# Patient Record
Sex: MALE | URBAN - METROPOLITAN AREA
[De-identification: names, ages, dates, MRNs, and addresses within clinical notes are randomized per-mention and may not be internally consistent; named-entity substitution may affect disease eponyms.]

---

## 2017-08-07 ENCOUNTER — APPOINTMENT (OUTPATIENT)
Dept: GENERAL RADIOLOGY | Facility: CLINIC | Age: 22
End: 2017-08-07
Attending: NURSE PRACTITIONER
Payer: OTHER MISCELLANEOUS

## 2017-08-07 ENCOUNTER — HOSPITAL ENCOUNTER (EMERGENCY)
Facility: CLINIC | Age: 22
Discharge: HOME | End: 2017-08-07
Attending: NURSE PRACTITIONER | Admitting: NURSE PRACTITIONER
Payer: OTHER MISCELLANEOUS

## 2017-08-07 VITALS
RESPIRATION RATE: 16 BRPM | HEIGHT: 66 IN | TEMPERATURE: 98.2 F | DIASTOLIC BLOOD PRESSURE: 74 MMHG | BODY MASS INDEX: 22.32 KG/M2 | WEIGHT: 138.89 LBS | SYSTOLIC BLOOD PRESSURE: 130 MMHG | HEART RATE: 63 BPM | OXYGEN SATURATION: 99 %

## 2017-08-07 DIAGNOSIS — S81.012A: ICD-10-CM

## 2017-08-07 PROCEDURE — 90715 TDAP VACCINE 7 YRS/> IM: CPT | Performed by: NURSE PRACTITIONER

## 2017-08-07 PROCEDURE — 25000128 H RX IP 250 OP 636: Performed by: NURSE PRACTITIONER

## 2017-08-07 PROCEDURE — 90471 IMMUNIZATION ADMIN: CPT

## 2017-08-07 PROCEDURE — 73562 X-RAY EXAM OF KNEE 3: CPT | Mod: LT

## 2017-08-07 PROCEDURE — 12002 RPR S/N/AX/GEN/TRNK2.6-7.5CM: CPT

## 2017-08-07 PROCEDURE — 99283 EMERGENCY DEPT VISIT LOW MDM: CPT | Mod: 25

## 2017-08-07 RX ORDER — CEPHALEXIN 500 MG/1
500 CAPSULE ORAL 4 TIMES DAILY
Qty: 28 CAPSULE | Refills: 0 | Status: SHIPPED | OUTPATIENT
Start: 2017-08-07 | End: 2017-08-14

## 2017-08-07 RX ADMIN — CLOSTRIDIUM TETANI TOXOID ANTIGEN (FORMALDEHYDE INACTIVATED), CORYNEBACTERIUM DIPHTHERIAE TOXOID ANTIGEN (FORMALDEHYDE INACTIVATED), BORDETELLA PERTUSSIS TOXOID ANTIGEN (GLUTARALDEHYDE INACTIVATED), BORDETELLA PERTUSSIS FILAMENTOUS HEMAGGLUTININ ANTIGEN (FORMALDEHYDE INACTIVATED), BORDETELLA PERTUSSIS PERTACTIN ANTIGEN, AND BORDETELLA PERTUSSIS FIMBRIAE 2/3 ANTIGEN 0.5 ML: 5; 2; 2.5; 5; 3; 5 INJECTION, SUSPENSION INTRAMUSCULAR at 20:57

## 2017-08-07 ASSESSMENT — ENCOUNTER SYMPTOMS
WOUND: 1
NUMBNESS: 0
WEAKNESS: 0

## 2017-08-07 NOTE — ED AVS SNAPSHOT
Emergency Department    [address] [facility] [address]    Phone:  [phone]    Fax:  [phone]                                       Issac Radford   MRN: [id number]    Department:   Emergency Department   Date of Visit:  8/7/2017           Patient Information     Date Of Birth          1995        Your diagnoses for this visit were:     Laceration of knee without complication, left, initial encounter        You were seen by Zeny Montilla, CNP.      Follow-up Information     Follow up with Travis Mancini MD In 1 week.    Specialty:  Internal Medicine    Why:  10-14 days for suture removal    Contact information:    [facility]  [address]  [facility] [id number] [phone]          Follow up with  Emergency Department.    Specialty:  EMERGENCY MEDICINE    Why:  As needed, If symptoms worsen    Contact information:    [address] [facility] [address] [phone]        Discharge Instructions       Discharge Instructions  Laceration (Cut)    You were seen today for a laceration (cut).  Your provider examined your laceration for any problems such a buried foreign body (like glass, a splinter, or gravel), or injury to blood vessels, tendons, and nerves.  Your provider may have also rinsed and/or scrubbed your laceration to help prevent an infection. It may not be possible to find all problems with your laceration on the first visit; occasionally foreign bodies or a tendon injury can go undetected.    Your laceration may have been closed in one of several ways:    No closure: many wounds will heal just fine without closure.    Stitches: regular stitches that require removal.    Staples: skin staples are often used in the scalp/head.    Wound adhesive (glue): skin glue can be used for certain lacerations and doesn t require removal.    Wound strips (aka Butterfly bandages or steri-strips): these are bandages that help to  close a wound.    Absorbable stitches:  dissolving  stitches that go away on their own and usually don t require removal.    A small percentage of wounds will develop an infection regardless of how well the wound is cared for. Antibiotics are generally not indicated to prevent an infection so are only given for a small number of high-risk wounds. Some lacerations are too high risk to close, and are left open to heal because closure can increase the likelihood that an infection will develop.    Remember that all lacerations, no matter how expertly repaired, will cause scarring. We consider many factors, techniques, and materials, in our efforts to provide the best possible cosmetic outcome.    Generally, every Emergency Department visit should have a follow-up clinic visit with either a primary or a specialty clinic/provider. Please follow-up as instructed by your emergency provider today.     Return to the Emergency Department right away if:    You have more redness, swelling, pain, drainage (pus), a bad smell, or red streaking from your laceration as these symptoms could indicate an infection.    You have a fever of 100.4 F or more.    You have bleeding that you cannot stop at home. If your cut starts to bleed, hold pressure on the bleeding area with a clean cloth or put pressure over the bandage.  If the bleeding does not stop after using constant pressure for 30 minutes, you should return to the Emergency Department for further treatment.    An area past the laceration is cool, pale, or blue compared with the other side, or has a slower return of color when squeezed.    Your dressing seems too tight or starts to get uncomfortable or painful. For children, signs of a problem might be irritability or restlessness.    You have loss of normal function or use of an area, such as being unable to straighten or bend a finger normally.    You have a numb area past the laceration.    Return to the Emergency Department or  see your regular provider if:    The laceration starts to come open.     You have something coming out of the cut or a feeling that there is something in the laceration.    Your wound will not heal, or keeps breaking open. There can always be glass, wood, dirt or other things in any wound.  They will not always show up, even on x-rays.  If a wound does not heal, this may be why, and it is important to follow-up with your regular provider.    Home Care:    Take your dressing off in 12-24 hours, or as instructed by your provider, to check your laceration. Remove the dressing sooner if it seems too tight or painful, or if it is getting numb, tingly, or pale past the dressing.    Gently wash your laceration 1-2 times daily with clean water and mild soap. It is okay to shower or run clean water over the laceration, but do not let the laceration soak in water (no swimming).    If your laceration was closed with wound adhesive or strips: pat it dry and leave it open to the air. For all other repairs: after you wash your laceration, or at least 2 times a day, apply antibiotic ointment (such as Neosporin  or Bacitracin ) to the laceration, then cover it with a Band-Aid  or gauze.    Keep the laceration clean. Wear gloves or other protective clothing if you are around dirt.    Follow-up for removal:    If your wound was closed with staples or regular stitches, they need to be removed according to the instructions and timeline specified by your provider today.    If your wound was closed with absorbable ( dissolving ) sutures, they should fall out, dissolve, or not be visible in about one week. If they are still visible, then they should be removed according to the instructions and timeline specified by your provider today.    Scars:  To help minimize scarring:    Wear sunscreen over the healed laceration when out in the sun.    Massage the area regularly once healed.    You may apply Vitamin E to the healed wound.    Wait.  Scars improve in appearance over months and years.    If you were given a prescription for medicine here today, be sure to read all of the information (including the package insert) that comes with your prescription.  This will include important information about the medicine, its side effects, and any warnings that you need to know about.  The pharmacist who fills the prescription can provide more information and answer questions you may have about the medicine.  If you have questions or concerns that the pharmacist cannot address, please call or return to the Emergency Department.       Remember that you can always come back to the Emergency Department if you are not able to see your regular provider in the amount of time listed above, if you get any new symptoms, or if there is anything that worries you.    24 Hour Appointment Hotline       To make an appointment at any [facility], call [phone] ([phone]). If you don't have a family doctor or clinic, we will help you find one. [facility] clinics are conveniently located to serve the needs of you and your family.             Review of your medicines      START taking        Dose / Directions Last dose taken    cephALEXin 500 MG capsule   Commonly known as:  KEFLEX   Dose:  500 mg   Quantity:  28 capsule        Take 1 capsule (500 mg) by mouth 4 times daily for 7 days   Refills:  0                Prescriptions were sent or printed at these locations (1 Prescription)                   Other Prescriptions                Printed at Department/Unit printer (1 of 1)         cephALEXin (KEFLEX) 500 MG capsule                Procedures and tests performed during your visit     Knee XR, 3 views, left      Orders Needing Specimen Collection     None      Pending Results     No orders found from 8/5/2017 to 8/8/2017.            Pending Culture Results     No orders found from 8/5/2017 to 8/8/2017.            Pending Results Instructions     If you had any lab  results that were not finalized at the time of your Discharge, you can call the ED Lab Result RN at [phone]. You will be contacted by this team for any positive Lab results or changes in treatment. The nurses are available 7 days a week from 10A to 6:30P.  You can leave a message 24 hours per day and they will return your call.        Test Results From Your Hospital Stay        8/7/2017  9:31 PM      Narrative     LEFT KNEE THREE VIEWS  8/7/2017 9:07 PM      HISTORY: Struck knee on metal with 2cm laceration.    COMPARISON: None.        Impression     IMPRESSION: No acute fracture or dislocation. No radiopaque foreign  body.    GHAZALA DAS MD                Clinical Quality Measure: Blood Pressure Screening     Your blood pressure was checked while you were in the emergency department today. The last reading we obtained was  BP: 130/74 . Please read the guidelines below about what these numbers mean and what you should do about them.  If your systolic blood pressure (the top number) is less than 120 and your diastolic blood pressure (the bottom number) is less than 80, then your blood pressure is normal. There is nothing more that you need to do about it.  If your systolic blood pressure (the top number) is 120-139 or your diastolic blood pressure (the bottom number) is 80-89, your blood pressure may be higher than it should be. You should have your blood pressure rechecked within a year by a primary care provider.  If your systolic blood pressure (the top number) is 140 or greater or your diastolic blood pressure (the bottom number) is 90 or greater, you may have high blood pressure. High blood pressure is treatable, but if left untreated over time it can put you at risk for heart attack, stroke, or kidney failure. You should have your blood pressure rechecked by a primary care provider within the next 4 weeks.  If your provider in the emergency department today gave you specific instructions to follow-up  "with your doctor or provider even sooner than that, you should follow that instruction and not wait for up to 4 weeks for your follow-up visit.        Thank you for choosing [facility]       Thank you for choosing [facility] for your care. Our goal is always to provide you with excellent care. Hearing back from our patients is one way we can continue to improve our services. Please take a few minutes to complete the written survey that you may receive in the mail after you visit with us. Thank you!        visetohart Information     [employer] lets you send messages to your doctor, view your test results, renew your prescriptions, schedule appointments and more. To sign up, go to [email or web address] . Click on \"Log in\" on the left side of the screen, which will take you to the Welcome page. Then click on \"Sign up Now\" on the right side of the page.     You will be asked to enter the access code listed below, as well as some personal information. Please follow the directions to create your username and password.     Your access code is: [email or web address]  Expires: 2017 10:21 PM     Your access code will  in 90 days. If you need help or a new code, please call your [facility] clinic or [phone].        Care EveryWhere ID     This is your Care EveryWhere ID. This could be used by other organizations to access your [facility] medical records  [id number]        Equal Access to Services     JEREMIAH BAIRD : Hadii amanda Stapleton, surekha mae, qamassiel peters . So [facility] [phone].    ATENCIÓN: Si habla español, tiene a negro disposición servicios gratuitos de asistencia lingüística. Sara al [phone].    We comply with applicable federal civil rights laws and [place] laws. We do not discriminate on the basis of race, color, national origin, age, disability sex, sexual orientation or gender identity.            After Visit Summary       This is your record. " Keep this with you and show to your community pharmacist(s) and doctor(s) at your next visit.

## 2017-08-07 NOTE — ED AVS SNAPSHOT
Emergency Department    [address] [facility] [address]    Phone:  [phone]    Fax:  [phone]                                       Issac Radford   MRN: [id number]    Department:   Emergency Department   Date of Visit:  8/7/2017           After Visit Summary Signature Page     I have received my discharge instructions, and my questions have been answered. I have discussed any challenges I see with this plan with the nurse or doctor.    ..........................................................................................................................................  Patient/Patient Representative Signature      ..........................................................................................................................................  Patient Representative Print Name and Relationship to Patient    ..................................................               ................................................  Date                                            Time    ..........................................................................................................................................  Reviewed by Signature/Title    ...................................................              ..............................................  Date                                                            Time

## 2017-08-08 NOTE — ED PROVIDER NOTES
"  History     Chief Complaint:  Left Leg Laceration     History limited due to language barrier and subsequently provided by patient's coworkers.   DOUG Radford is a 21 year old male who presents to the emergency department today for evaluation of a left leg laceration. The patient's coworker and brother reports the patient was working on a roof less than 2 hours ago when he struck his left knee on the metal soffit and gutter, sustaining a laceration. After the incident the patient states he was able to ambulate without problem. However he does endorse pain with bending the knee. He did not fall or sustain other injuries. The patient denies any numbness or weakness to his left lower extremity. The patient has no other concerns at this time.      I used the Language Line  to interview the patient and gain permission from the patient and the patient is requesting his brother to interpret for him.     Allergies:  No Known Drug Allergies      Medications:    The patient is currently on no regular medications.     Past Medical History:    History reviewed. No pertinent past medical history.     Past Surgical History:    History reviewed. No pertinent past surgical history.     Family History:    History reviewed. No pertinent family history.      Social History:  The patient was accompanied to the ED by his coworkers.     Review of Systems   Skin: Positive for wound (left knee laceration).   Neurological: Negative for weakness and numbness.   All other systems reviewed and are negative.    Physical Exam     Patient Vitals for the past 24 hrs:   BP Temp Temp src Pulse Resp SpO2 Height Weight   08/07/17 2028 130/74 98.2  F (36.8  C) Oral 63 16 99 % 1.676 m (5' 6\") 63 kg (138 lb 14.2 oz)      Physical Exam  Nursing notes reviewed. Vitals reviewed.  General: Alert. Well kept.  Eyes:  Conjunctiva non-injected, non-icteric.  Neck/Throat: Moist mucous membranes. Normal voice.  Cardiac: Regular rhythm. " Normal heart sounds.  Pulmonary: Clear and equal breath sounds bilaterally.   Musculoskeletal: Normal gross range of motion of all 4 extremities.   LLE: Full extension and flexion at knee without pain. Able to actively fully extend lower extremity without pain.  No tenderness to palpation over proximal tibia or fibula or distal femur.  Neurological: Alert and oriented x4.   Skin: Warm and dry. 3cm horizontal laceration through the anterior medial knee over the patella.  Psych: Affect normal. Good eye contact.    Emergency Department Course     Imaging:  Radiology findings were communicated with the patient and family who voiced understanding of the findings.    Knee XR, 3 views, left:  IMPRESSION: No acute fracture or dislocation. No radiopaque foreign  body.  Report per radiology      Procedures:     Laceration Repair      LACERATION:  A simple clean 3 cm laceration.    LOCATION:  Left knee.    FUNCTION:  Distally sensation, circulation, motor and tendon function are intact.    ANESTHESIA:  Local using Bupivacaine    PREPARATION:  Scrubbing with Betadine and Shur Clens. Irrigated with normal saline    DEBRIDEMENT:  no debridement.    CLOSURE:  Wound was closed with One Layer.  Skin closed with 6 x 4.0 Ethylon using interrupted sutures..    Interventions:  2057 TDAP 0.5 mL IM      Emergency Department Course:  Nursing notes and vitals reviewed.  2043 I performed an exam of the patient as documented above.   2116 Patient rechecked and updated on imaging results.  2155 I performed the laceration repair as documented above.   The patient was sent for a Knee XR, 3-views, left while in the emergency department, results above.    I personally reviewed the imaging results with the Patient and answered all related questions prior to discharge.   Impression & Plan      Medical Decision Making:  The patient presented with a laceration.  The wound was carefully evaluated and explored.  No foreign bodies were noted on  exploration or irrigation or with radiographic imaging.  There was no fracture or dislocation. The laceration was closed with sutures as noted herein.  There is no evidence of muscular, tendon, or bony damage with this laceration.  There is also no evidence of vascular or neurologic compromise.  Possible complications (infection, scarring) were reviewed with the patient. Patient was discharged home in a knee immobilizer to promote healing of the wound and he was instructed on avoiding excessive bending until healed. He will be started on Cephalexin due to the contamination of the wound. Follow up with primary care will be indicated for suture removal as noted in the discharge section.     Diagnosis:    ICD-10-CM    1. Laceration of knee without complication, left, initial encounter S81.012A      Disposition:  Discharged to home with the below prescription.   Discharge Medications:  New Prescriptions    CEPHALEXIN (KEFLEX) 500 MG CAPSULE    Take 1 capsule (500 mg) by mouth 4 times daily for 7 days       Scribe Disclosure:  I, Roderick Pisano, am serving as a scribe at 8:35 PM on 8/7/2017 to document services personally performed by Zeny Montilla CNP based on my observations and the provider's statements to me.         Zeny Montilla CNP  08/07/17 1542

## 2017-08-08 NOTE — ED NOTES
Patient verbalized understanding after instructions given via  at discharge without further instructions.

## 2017-08-08 NOTE — DISCHARGE INSTRUCTIONS
Discharge Instructions  Laceration (Cut)    You were seen today for a laceration (cut).  Your provider examined your laceration for any problems such a buried foreign body (like glass, a splinter, or gravel), or injury to blood vessels, tendons, and nerves.  Your provider may have also rinsed and/or scrubbed your laceration to help prevent an infection. It may not be possible to find all problems with your laceration on the first visit; occasionally foreign bodies or a tendon injury can go undetected.    Your laceration may have been closed in one of several ways:    No closure: many wounds will heal just fine without closure.    Stitches: regular stitches that require removal.    Staples: skin staples are often used in the scalp/head.    Wound adhesive (glue): skin glue can be used for certain lacerations and doesn t require removal.    Wound strips (aka Butterfly bandages or steri-strips): these are bandages that help to close a wound.    Absorbable stitches:  dissolving  stitches that go away on their own and usually don t require removal.    A small percentage of wounds will develop an infection regardless of how well the wound is cared for. Antibiotics are generally not indicated to prevent an infection so are only given for a small number of high-risk wounds. Some lacerations are too high risk to close, and are left open to heal because closure can increase the likelihood that an infection will develop.    Remember that all lacerations, no matter how expertly repaired, will cause scarring. We consider many factors, techniques, and materials, in our efforts to provide the best possible cosmetic outcome.    Generally, every Emergency Department visit should have a follow-up clinic visit with either a primary or a specialty clinic/provider. Please follow-up as instructed by your emergency provider today.     Return to the Emergency Department right away if:    You have more redness, swelling, pain, drainage  (pus), a bad smell, or red streaking from your laceration as these symptoms could indicate an infection.    You have a fever of 100.4 F or more.    You have bleeding that you cannot stop at home. If your cut starts to bleed, hold pressure on the bleeding area with a clean cloth or put pressure over the bandage.  If the bleeding does not stop after using constant pressure for 30 minutes, you should return to the Emergency Department for further treatment.    An area past the laceration is cool, pale, or blue compared with the other side, or has a slower return of color when squeezed.    Your dressing seems too tight or starts to get uncomfortable or painful. For children, signs of a problem might be irritability or restlessness.    You have loss of normal function or use of an area, such as being unable to straighten or bend a finger normally.    You have a numb area past the laceration.    Return to the Emergency Department or see your regular provider if:    The laceration starts to come open.     You have something coming out of the cut or a feeling that there is something in the laceration.    Your wound will not heal, or keeps breaking open. There can always be glass, wood, dirt or other things in any wound.  They will not always show up, even on x-rays.  If a wound does not heal, this may be why, and it is important to follow-up with your regular provider.    Home Care:    Take your dressing off in 12-24 hours, or as instructed by your provider, to check your laceration. Remove the dressing sooner if it seems too tight or painful, or if it is getting numb, tingly, or pale past the dressing.    Gently wash your laceration 1-2 times daily with clean water and mild soap. It is okay to shower or run clean water over the laceration, but do not let the laceration soak in water (no swimming).    If your laceration was closed with wound adhesive or strips: pat it dry and leave it open to the air. For all other repairs:  after you wash your laceration, or at least 2 times a day, apply antibiotic ointment (such as Neosporin  or Bacitracin ) to the laceration, then cover it with a Band-Aid  or gauze.    Keep the laceration clean. Wear gloves or other protective clothing if you are around dirt.    Follow-up for removal:    If your wound was closed with staples or regular stitches, they need to be removed according to the instructions and timeline specified by your provider today.    If your wound was closed with absorbable ( dissolving ) sutures, they should fall out, dissolve, or not be visible in about one week. If they are still visible, then they should be removed according to the instructions and timeline specified by your provider today.    Scars:  To help minimize scarring:    Wear sunscreen over the healed laceration when out in the sun.    Massage the area regularly once healed.    You may apply Vitamin E to the healed wound.    Wait. Scars improve in appearance over months and years.    If you were given a prescription for medicine here today, be sure to read all of the information (including the package insert) that comes with your prescription.  This will include important information about the medicine, its side effects, and any warnings that you need to know about.  The pharmacist who fills the prescription can provide more information and answer questions you may have about the medicine.  If you have questions or concerns that the pharmacist cannot address, please call or return to the Emergency Department.       Remember that you can always come back to the Emergency Department if you are not able to see your regular provider in the amount of time listed above, if you get any new symptoms, or if there is anything that worries you.